# Patient Record
Sex: FEMALE | Race: OTHER
[De-identification: names, ages, dates, MRNs, and addresses within clinical notes are randomized per-mention and may not be internally consistent; named-entity substitution may affect disease eponyms.]

---

## 2019-02-05 ENCOUNTER — HOSPITAL ENCOUNTER (EMERGENCY)
Dept: HOSPITAL 61 - ER | Age: 24
Discharge: HOME | End: 2019-02-05
Payer: SELF-PAY

## 2019-02-05 VITALS — WEIGHT: 136 LBS | BODY MASS INDEX: 25.68 KG/M2 | HEIGHT: 61 IN

## 2019-02-05 VITALS — DIASTOLIC BLOOD PRESSURE: 71 MMHG | SYSTOLIC BLOOD PRESSURE: 106 MMHG

## 2019-02-05 DIAGNOSIS — O99.511: Primary | ICD-10-CM

## 2019-02-05 DIAGNOSIS — R06.02: ICD-10-CM

## 2019-02-05 DIAGNOSIS — Z3A.08: ICD-10-CM

## 2019-02-05 LAB
ANION GAP SERPL CALC-SCNC: 10 MMOL/L (ref 6–14)
APTT PPP: YELLOW S
BACTERIA #/AREA URNS HPF: (no result) /HPF
BASOPHILS # BLD AUTO: 0 X10^3/UL (ref 0–0.2)
BASOPHILS NFR BLD: 0 % (ref 0–3)
BILIRUB UR QL STRIP: NEGATIVE
BUN SERPL-MCNC: 13 MG/DL (ref 7–20)
CALCIUM SERPL-MCNC: 9.8 MG/DL (ref 8.5–10.1)
CHLORIDE SERPL-SCNC: 103 MMOL/L (ref 98–107)
CO2 SERPL-SCNC: 26 MMOL/L (ref 21–32)
CREAT SERPL-MCNC: 0.8 MG/DL (ref 0.6–1)
EOSINOPHIL NFR BLD: 0 X10^3/UL (ref 0–0.7)
EOSINOPHIL NFR BLD: 1 % (ref 0–3)
ERYTHROCYTE [DISTWIDTH] IN BLOOD BY AUTOMATED COUNT: 13.6 % (ref 11.5–14.5)
FIBRINOGEN PPP-MCNC: CLEAR MG/DL
GFR SERPLBLD BASED ON 1.73 SQ M-ARVRAT: 89.7 ML/MIN
GLUCOSE SERPL-MCNC: 117 MG/DL (ref 70–99)
HCT VFR BLD CALC: 39.3 % (ref 36–47)
HGB BLD-MCNC: 13.2 G/DL (ref 12–15.5)
LYMPHOCYTES # BLD: 2.1 X10^3/UL (ref 1–4.8)
LYMPHOCYTES NFR BLD AUTO: 23 % (ref 24–48)
MCH RBC QN AUTO: 29 PG (ref 25–35)
MCHC RBC AUTO-ENTMCNC: 34 G/DL (ref 31–37)
MCV RBC AUTO: 88 FL (ref 79–100)
MONO #: 0.6 X10^3/UL (ref 0–1.1)
MONOCYTES NFR BLD: 6 % (ref 0–9)
NEUT #: 6.5 X10^3UL (ref 1.8–7.7)
NEUTROPHILS NFR BLD AUTO: 70 % (ref 31–73)
NITRITE UR QL STRIP: NEGATIVE
PH UR STRIP: 6 [PH]
PLATELET # BLD AUTO: 271 X10^3/UL (ref 140–400)
POTASSIUM SERPL-SCNC: 3.1 MMOL/L (ref 3.5–5.1)
PROT UR STRIP-MCNC: NEGATIVE MG/DL
RBC # BLD AUTO: 4.48 X10^6/UL (ref 3.5–5.4)
RBC #/AREA URNS HPF: 0 /HPF (ref 0–2)
SODIUM SERPL-SCNC: 139 MMOL/L (ref 136–145)
SQUAMOUS #/AREA URNS LPF: (no result) /LPF
UROBILINOGEN UR-MCNC: 0.2 MG/DL
WBC # BLD AUTO: 9.2 X10^3/UL (ref 4–11)
WBC #/AREA URNS HPF: 0 /HPF (ref 0–4)

## 2019-02-05 PROCEDURE — 81001 URINALYSIS AUTO W/SCOPE: CPT

## 2019-02-05 PROCEDURE — 85025 COMPLETE CBC W/AUTO DIFF WBC: CPT

## 2019-02-05 PROCEDURE — 94640 AIRWAY INHALATION TREATMENT: CPT

## 2019-02-05 PROCEDURE — 85379 FIBRIN DEGRADATION QUANT: CPT

## 2019-02-05 PROCEDURE — 81025 URINE PREGNANCY TEST: CPT

## 2019-02-05 PROCEDURE — 80048 BASIC METABOLIC PNL TOTAL CA: CPT

## 2019-02-05 PROCEDURE — 36415 COLL VENOUS BLD VENIPUNCTURE: CPT

## 2019-02-05 PROCEDURE — 99283 EMERGENCY DEPT VISIT LOW MDM: CPT

## 2019-02-05 NOTE — PHYS DOC
Adult General


Chief Complaint


Chief Complaint:  SHORTNESS OF BREATH





HPI


HPI





RN at bedside for translation as patient speaks Burkinan primary.





23-year-old female presents to ER for complaints of sudden onset of shortness 

of air following her lunch. Patient states she was seen by her OB/GYN this 

morning as she been having vaginal spotting and is approximately 8 weeks 

pregnant. Patient states she had pelvic exam, ultrasound, and labs done. 

Patient states she's had no change in vaginal spotting and denies any lower 

back or abdominal pain. Patient states following her lunch she had sudden onset 

of shortness of air and so came to the ER for evaluation. Patient denies any 

recent illness or travel. Patient denies previous history of blood clots. 

Patient states she is  2 para 1.





Review of Systems


Review of Systems





Constitutional: Denies fever or chills. Denies fatigue


Eyes: Denies change in visual acuity, redness, or eye pain []


HENT: Denies nasal congestion or sore throat []


Respiratory: Denies cough. Reports SOA


Cardiovascular: No additional information not addressed in HPI []


GI: Denies abdominal pain, nausea, vomiting, bloody stools or diarrhea []


: Denies dysuria or hematuria []


Musculoskeletal: Denies back pain or joint pain []


Integument: Denies rash or skin lesions []


Neurologic: Denies headache, focal weakness or sensory changes []


Endocrine: Denies polyuria or polydipsia []





All other systems were reviewed and found to be within normal limits, except as 

documented in this note.





Current Medications


Current Medications





Current Medications








 Medications


  (Trade)  Dose


 Ordered  Sig/Donte  Start Time


 Stop Time Status Last Admin


Dose Admin


 


 Albuterol Sulfate


  (Ventolin Neb


 Soln)  2.5 mg  1X  ONCE  19 17:15


 19 17:16 DC 19 17:23


2.5 MG











Allergies


Allergies





Allergies








Coded Allergies Type Severity Reaction Last Updated Verified


 


  No Known Drug Allergies    19 No











Physical Exam


Physical Exam





Constitutional: Well developed, well nourished, no acute distress, non-toxic 

appearance. []


HENT: Normocephalic, atraumatic, oropharynx moist, no oral exudates, nose 

normal. []


Eyes: Pupils equal, conjunctiva normal, no discharge. [] 


Neck: Normal range of motion, no tenderness, supple, no stridor. [] 


Cardiovascular: Heart rate regular rhythm, no murmur []


Lungs & Thorax:  Bilateral breath sounds clear to auscultation- diminished in 

bases. Resp. equal/nonlabored. Speaking in full sentences. No wheezing/rhonchi


Abdomen: Bowel sounds normal, soft- no distention/rigidity, no tenderness


Skin: Warm, dry, no erythema, no rash. [] 


Back: No tenderness, no CVA tenderness. [] 


Extremities: No tenderness, no cyanosis, no clubbing, ROM intact, no edema. [] 


Neurologic: Alert and oriented X 3, normal motor function, normal sensory 

function, no focal deficits noted. []


Psychologic: Affect normal, judgement normal, mood normal. []





Current Patient Data


Vital Signs





 Vital Signs








  Date Time  Temp Pulse Resp B/P (MAP) Pulse Ox O2 Delivery O2 Flow Rate FiO2


 


19 19:01  96 16 106/71 (83) 100 Room Air  


 


19 16:27 98.5       





 98.5       








Lab Values





 Laboratory Tests








Test


 19


16:27 19


16:49 19


18:10


 


Urine Collection Type Unknown    


 


Urine Color Yellow    


 


Urine Clarity Clear    


 


Urine pH 6.0    


 


Urine Specific Gravity 1.020    


 


Urine Protein


 Negative mg/dL


(NEG-TRACE) 


 





 


Urine Glucose (UA)


 Negative mg/dL


(NEG) 


 





 


Urine Ketones (Stick)


 15 mg/dL (NEG)


 


 





 


Urine Blood Trace (NEG)    


 


Urine Nitrite


 Negative (NEG)


 


 





 


Urine Bilirubin


 Negative (NEG)


 


 





 


Urine Urobilinogen Dipstick


 0.2 mg/dL (0.2


mg/dL) 


 





 


Urine Leukocyte Esterase


 Negative (NEG)


 


 





 


Urine RBC 0 /HPF (0-2)    


 


Urine WBC 0 /HPF (0-4)    


 


Urine Squamous Epithelial


Cells Few /LPF  


 


 





 


Urine Bacteria


 Few /HPF


(0-FEW) 


 





 


POC Urine HCG, Qualitative


 


 Hcg positive


(Negative) 





 


White Blood Count


 


 


 9.2 x10^3/uL


(4.0-11.0)


 


Red Blood Count


 


 


 4.48 x10^6/uL


(3.50-5.40)


 


Hemoglobin


 


 


 13.2 g/dL


(12.0-15.5)


 


Hematocrit


 


 


 39.3 %


(36.0-47.0)


 


Mean Corpuscular Volume


 


 


 88 fL ()





 


Mean Corpuscular Hemoglobin   29 pg (25-35)  


 


Mean Corpuscular Hemoglobin


Concent 


 


 34 g/dL


(31-37)


 


Red Cell Distribution Width


 


 


 13.6 %


(11.5-14.5)


 


Platelet Count


 


 


 271 x10^3/uL


(140-400)


 


Neutrophils (%) (Auto)   70 % (31-73)  


 


Lymphocytes (%) (Auto)   23 % (24-48)  L


 


Monocytes (%) (Auto)   6 % (0-9)  


 


Eosinophils (%) (Auto)   1 % (0-3)  


 


Basophils (%) (Auto)   0 % (0-3)  


 


Neutrophils # (Auto)


 


 


 6.5 x10^3uL


(1.8-7.7)


 


Lymphocytes # (Auto)


 


 


 2.1 x10^3/uL


(1.0-4.8)


 


Monocytes # (Auto)


 


 


 0.6 x10^3/uL


(0.0-1.1)


 


Eosinophils # (Auto)


 


 


 0.0 x10^3/uL


(0.0-0.7)


 


Basophils # (Auto)


 


 


 0.0 x10^3/uL


(0.0-0.2)


 


D-Dimer (Natali)


 


 


 0.34 ug/mlFEU


(0.00-0.50)


 


Sodium Level


 


 


 139 mmol/L


(136-145)


 


Potassium Level


 


 


 3.1 mmol/L


(3.5-5.1)  L


 


Chloride Level


 


 


 103 mmol/L


()


 


Carbon Dioxide Level


 


 


 26 mmol/L


(21-32)


 


Anion Gap   10 (6-14)  


 


Blood Urea Nitrogen


 


 


 13 mg/dL


(7-20)


 


Creatinine


 


 


 0.8 mg/dL


(0.6-1.0)


 


Estimated GFR


(Cockcroft-Gault) 


 


 89.7  





 


Glucose Level


 


 


 117 mg/dL


(70-99)  H


 


Calcium Level


 


 


 9.8 mg/dL


(8.5-10.1)





 Laboratory Tests


19 18:10








 Laboratory Tests


19 18:10














EKG


EKG


[]





Radiology/Procedures


Radiology/Procedures


[]





Course & Med Decision Making


Course & Med Decision Making


Pertinent Labs and Imaging studies reviewed. (See chart for details)





In-depth conversation had with patient and her  regarding pregnancy and 

risk for PE. Patient is not a smoker and has had no previous history of blood 

clots. Patient denies any hormonal therapy. He denies any recent travel. 

Following discussion patient is wanting labs to verify d-dimer. Following that 

test will further discuss options for further evaluation with patient and her 

. Patient will be given albuterol treatment while in the ER. Patient 

reports her shortness of air had improved since onset of symptoms. Patient 

denies any radiation of pain into neck, back, or upper extremities. Patient 

states she felt tight in her chest when shortness of air started reports the 

tightness has subsided. She was speaking in full sentences during discussion 

with equal nonlabored respirations. Heart rate is 80-90s.





1905: RN translating discussed test results. Potassium 3.1 patient encouraged 

to increase potassium-rich foods in diet. D-dimer NL at 0.34. She reports her 

symptoms have improved since receiving albuterol treatment. At this time 

respirations equal and nonlabored. Patient has had increased air movement 

throughout all lung fields. Patient is nontoxic in appearance and denies any 

pain at this time. Discussed plans for patient follow-up with her OB/GYN. 

Education provided on signs and symptoms to return to ER for and discharge 

instructions were discussed.





Dragon Disclaimer


Dragon Disclaimer


This electronic medical record was generated, in whole or in part, using a 

voice recognition dictation system.





Departure


Departure


Impression:  


 Primary Impression:  


 Shortness of breath during pregnancy


Disposition:  01 HOME, SELF-CARE


Condition:  STABLE


Patient Instructions:  Shortness of Breath





Additional Instructions:  


Your potassium was 3.1 on your labs you should increase potassium-rich foods 

such as bananas and potatoes. Continue taking daily prenatal vitamins. Follow-

up with your OB/GYN doctor and discuss your emergency department visit.











RODRIGO MURO 2019 17:08